# Patient Record
Sex: MALE | Race: WHITE | URBAN - METROPOLITAN AREA
[De-identification: names, ages, dates, MRNs, and addresses within clinical notes are randomized per-mention and may not be internally consistent; named-entity substitution may affect disease eponyms.]

---

## 2018-08-04 ENCOUNTER — EMERGENCY (EMERGENCY)
Facility: HOSPITAL | Age: 48
LOS: 1 days | Discharge: HOME | End: 2018-08-04
Attending: EMERGENCY MEDICINE
Payer: COMMERCIAL

## 2018-08-04 VITALS
HEART RATE: 71 BPM | RESPIRATION RATE: 20 BRPM | DIASTOLIC BLOOD PRESSURE: 87 MMHG | SYSTOLIC BLOOD PRESSURE: 118 MMHG | OXYGEN SATURATION: 97 % | TEMPERATURE: 98 F

## 2018-08-04 VITALS — WEIGHT: 179.9 LBS | HEIGHT: 70 IN

## 2018-08-04 PROCEDURE — 99283 EMERGENCY DEPT VISIT LOW MDM: CPT

## 2018-08-05 PROCEDURE — 99283 EMERGENCY DEPT VISIT LOW MDM: CPT

## 2018-08-05 RX ORDER — VALACYCLOVIR 500 MG/1
1 TABLET, FILM COATED ORAL
Qty: 21 | Refills: 0 | OUTPATIENT
Start: 2018-08-05 | End: 2018-08-11

## 2018-08-05 RX ORDER — MOMETASONE FUROATE 1 MG/G
1 CREAM TOPICAL
Qty: 1 | Refills: 0 | OUTPATIENT
Start: 2018-08-05 | End: 2018-08-14

## 2018-08-05 RX ORDER — VALACYCLOVIR 500 MG/1
1000 TABLET, FILM COATED ORAL ONCE
Qty: 0 | Refills: 0 | Status: COMPLETED | OUTPATIENT
Start: 2018-08-05 | End: 2018-08-05

## 2018-08-05 RX ADMIN — Medication 60 MILLIGRAM(S): at 01:11

## 2018-08-05 RX ADMIN — VALACYCLOVIR 1000 MILLIGRAM(S): 500 TABLET, FILM COATED ORAL at 01:10

## 2018-08-05 NOTE — ED PROVIDER NOTE - CHPI ED SYMPTOMS NEG
no pain/no petechia/no decreased eating/drinking/no inflammation/no bruising/no chills/no itching/no scaly patches on skin/no vomiting/no fever

## 2018-08-05 NOTE — ED ADULT NURSE NOTE - ED STAT RN HANDOFF DETAILS
pt a+ox3, in no apparent pain or discomfort. ambulating with steady gait. RN to provide meds only, PA to d/c.

## 2018-08-05 NOTE — ED PROVIDER NOTE - OBJECTIVE STATEMENT
48 yo M presents to ED c/o non-itchy rash on face x last 2 days which has been spreading form forehead/eyebrow and now to L temple.  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  On exam, pt awake and alert in NAD + erythematous, maculopapular lesions, no drainage, no warmth or signs of infection.  Rx topical steroids with Derm f/u as outpt 48 yo M presents to ED c/o non-itchy rash on face x last 2 days which has been spreading form forehead/eyebrow and now to L temple.  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  On exam, pt awake and alert in NAD + erythematous, papular lesions with ? early blistering, no drainage, no warmth or signs of infection.  Rx topical steroids and will start Valtrex based on fact that lesions do not cross m/l and opt with reported hx of painless Zoster in the past Derm f/u as outpt

## 2018-08-05 NOTE — ED PROVIDER NOTE - ATTENDING CONTRIBUTION TO CARE
46 yo M presents to ED c/o rash to face x last 2 days which is spreading to other areas.  ? 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  No SOB, difficulty breathing change in voice of sore throat.  On exam pt awake and alert in NAD,  + papular skin lesions with ? early blistering, no lesions cross m/l, no drainage or signs of superinfection,  Rx topical steroids and Valtrex with outpt Derm f/u

## 2020-12-07 ENCOUNTER — TRANSCRIPTION ENCOUNTER (OUTPATIENT)
Age: 50
End: 2020-12-07

## 2021-07-06 ENCOUNTER — EMERGENCY (EMERGENCY)
Facility: HOSPITAL | Age: 51
LOS: 1 days | End: 2021-07-06
Attending: EMERGENCY MEDICINE
Payer: COMMERCIAL

## 2021-07-06 VITALS
HEART RATE: 74 BPM | WEIGHT: 179.9 LBS | SYSTOLIC BLOOD PRESSURE: 120 MMHG | DIASTOLIC BLOOD PRESSURE: 79 MMHG | OXYGEN SATURATION: 97 % | HEIGHT: 70 IN | RESPIRATION RATE: 19 BRPM | TEMPERATURE: 98 F

## 2021-07-06 PROCEDURE — 99282 EMERGENCY DEPT VISIT SF MDM: CPT

## 2021-07-06 PROCEDURE — 99284 EMERGENCY DEPT VISIT MOD MDM: CPT

## 2021-07-06 NOTE — ED PROVIDER NOTE - NS CPE EDP MUSC CERVICAL LOC
No stepoffs or deformities. No midline vertebral ttp. No paraspinal ttp. Full AROM though has R trapezius pain with rightward deviation.

## 2021-07-06 NOTE — ED PROVIDER NOTE - CARE PLAN
Principal Discharge DX:	Head trauma, initial encounter   Principal Discharge DX:	Head trauma, initial encounter  Secondary Diagnosis:	Concussion without loss of consciousness, initial encounter

## 2021-07-06 NOTE — ED ADULT NURSE NOTE - OBJECTIVE STATEMENT
49yo M, no pmh. c/o pain to back of head s/p mechanical slip and fall at work this morning. no LOC, no open injury, no vision change. pt has some nausea. no bleeding , hematoma or wound noted. steady gait. aaox4. PERRL.

## 2021-07-06 NOTE — ED PROVIDER NOTE - OBJECTIVE STATEMENT
49 yo male no pmhx presents to the ED c/o posterior head trauma sustained from slip and fall 1.5 hour PTA. Pt was at work, slipped on wet floor and fell backwards, hit back of his head on tile floor. No LOC, pt got up immediately after incident, felt nausea at first, since resolved. Feels well currently, only c/o mild pain to occiput, denies wanting analgesia. States he only came to ED because work requested he get evaluated. Denies syncope, LOC, numbness/tingling, cp, sob, dizziness, lightheadedness, vomiting, photophobia, neck pain/stiffness, additional injury, back pain, weakness, anticoagulant usage, difficulty walking, imbalance.

## 2021-07-06 NOTE — ED PROVIDER NOTE - NS CPE EDP MUSC LUMBAR LOC
No stepoffs or deformities. No stepoffs or deformities. No midline vertebral ttp. No paraspinal ttp. Full AROM.

## 2021-07-06 NOTE — ED PROVIDER NOTE - NSFOLLOWUPINSTRUCTIONS_ED_ALL_ED_FT
1. Follow up with your primary doctor within 48-72 hours for re-evaluation. If your symptoms persist past 1 week you may follow up with our concussion clinic.     2. Rest, Take Tylenol 650mg every 4-6 hours as needed for pain. Take Motrin 600 mg every 8 hours with food for additional relief.     3. If you develop any nausea, vomiting, worsening pain, dizziness, changes in vision or any other concerning symptoms please return to the Emergency Department immediately.

## 2021-07-06 NOTE — ED PROVIDER NOTE - ENMT, MLM
Head is normocephalic. Airway patent, Nasal mucosa clear. Mouth with normal mucosa. Throat has no vesicles, no oropharyngeal exudates and uvula is midline. No posterior auricular ecchymosis. No periorbital ecchymosis. No otorrhea or rhinorrhea.

## 2021-07-06 NOTE — ED PROVIDER NOTE - ATTENDING CONTRIBUTION TO CARE
Attending MD Sawyer:   I personally have seen and examined this patient.  Physician assistant note reviewed and agree on plan of care and except where noted.  See below for details.     Seen in [place] 66    50M with PMH/PSH including GERD on Omeprazole presents to the ED s/p fall backwards with head strike.  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  Reports fell backwards hitting the back of his head.  Denies preceding dizziness, weakness, sensory changes.  Denies LOC.  Reports pain at the site of impact but denies headache. Denies change in vision, double vision, sudden loss of vision.  Denies change in hearing, loss of hearing, tinnitus.  Denies numbness, weakness or tingling in extremities. Denies loss of urinary or bowel continence. Denies chest pain, shortness of breath, palpitations. Denies abdominal pain, vomiting, diarrhea.  Reports mild nausea.  Denies urinary complaints.  Denies recent illness, fevers, chills.  Denies tobacco, drugs.  Reports +EtOH beer on weekends.  Denies any today.  A ten (10) point review of systems was negative other than as stated in the HPI or elsewhere in the chart.    Exam:   General: NAD  HENT: head NCAT, no hematoma, no break in skin, no Tatum's sign, airway patent with moist mucous membranes  Eyes: PERRL, no periorbital ecchymosis  Lungs: lungs CTAB with good inspiratory effort, no wheezing, no rhonchi, no rales  Cardiac: +S1S2, no m/r/g  GI: abdomen soft with +BS, NT, ND  : no CVAT  MSK: FROM at neck, no tenderness to midline palpation, no stepoffs along length of spine, mild R trap tenderness to palpation, no calf tenderness, swelling, erythema or warmth  Neuro: CN 2-12 grossly intact, moving all extremities with 5/5 strength bilateral upper and lower extremities, sensory grossly intact, no gross neuro deficits  Psych: normal mood and affect    A/P: 50M s/p fall and head strike, suspect concussion, no signs of cord compression, no focal neuro deficits, [place] head CT negative, patient eager for discharge.  Reports will likely to go to work tomorrow.  Follow up instructions given, importance of follow up emphasized, return to ED parameters reviewed and patient verbalized understanding.  All questions answered, all concerns addressed.

## 2021-07-06 NOTE — ED PROVIDER NOTE - PROGRESS NOTE DETAILS
[place] Head CT negative. Pt appears well,. counseled on strict return precautions and f/u information. All questions answered. Pt stable for d/c. - Kalyan Beavers PA-C

## 2021-07-06 NOTE — ED PROVIDER NOTE - PATIENT PORTAL LINK FT
You can access the FollowMyHealth Patient Portal offered by [facility] by registering at the following website: [email or web address]. By joining [employer]’s FollowMyHealth portal, you will also be able to view your health information using other applications (apps) compatible with our system.

## 2021-07-06 NOTE — ED ADULT TRIAGE NOTE - CHIEF COMPLAINT QUOTE
headache s/p fall. Pt slipped on wet floor, hit head while at work at 0745 AM. Pt denies LOC, anti-coagulant use, blurry vision, vomiting.

## 2021-07-06 NOTE — ED PROVIDER NOTE - NSCAREINITIATED _GEN_ER
What Type Of Note Output Would You Prefer (Optional)?: Standard Output How Severe Are Your Spot(S)?: mild Have Your Spot(S) Been Treated In The Past?: has not been treated Hpi Title: Evaluation of a Skin Lesion Kalyan Beavers(PA)

## 2021-12-23 ENCOUNTER — TRANSCRIPTION ENCOUNTER (OUTPATIENT)
Age: 51
End: 2021-12-23